# Patient Record
Sex: FEMALE | Race: WHITE | URBAN - METROPOLITAN AREA
[De-identification: names, ages, dates, MRNs, and addresses within clinical notes are randomized per-mention and may not be internally consistent; named-entity substitution may affect disease eponyms.]

---

## 2019-12-27 ENCOUNTER — OFFICE VISIT (OUTPATIENT)
Dept: PRIMARY CARE CLINIC | Age: 44
End: 2019-12-27
Payer: COMMERCIAL

## 2019-12-27 VITALS
HEIGHT: 68 IN | OXYGEN SATURATION: 98 % | DIASTOLIC BLOOD PRESSURE: 64 MMHG | TEMPERATURE: 98.2 F | BODY MASS INDEX: 27.01 KG/M2 | WEIGHT: 178.2 LBS | HEART RATE: 65 BPM | SYSTOLIC BLOOD PRESSURE: 118 MMHG

## 2019-12-27 DIAGNOSIS — J06.9 VIRAL UPPER RESPIRATORY TRACT INFECTION: Primary | ICD-10-CM

## 2019-12-27 PROCEDURE — 99213 OFFICE O/P EST LOW 20 MIN: CPT | Performed by: NURSE PRACTITIONER

## 2019-12-27 PROCEDURE — G8419 CALC BMI OUT NRM PARAM NOF/U: HCPCS | Performed by: NURSE PRACTITIONER

## 2019-12-27 PROCEDURE — G8484 FLU IMMUNIZE NO ADMIN: HCPCS | Performed by: NURSE PRACTITIONER

## 2019-12-27 PROCEDURE — G8427 DOCREV CUR MEDS BY ELIG CLIN: HCPCS | Performed by: NURSE PRACTITIONER

## 2019-12-27 PROCEDURE — 1036F TOBACCO NON-USER: CPT | Performed by: NURSE PRACTITIONER

## 2019-12-27 RX ORDER — ESCITALOPRAM OXALATE 5 MG/1
5 TABLET ORAL DAILY
COMMUNITY

## 2019-12-27 SDOH — HEALTH STABILITY: MENTAL HEALTH: HOW OFTEN DO YOU HAVE A DRINK CONTAINING ALCOHOL?: NEVER

## 2019-12-27 ASSESSMENT — ENCOUNTER SYMPTOMS
SHORTNESS OF BREATH: 0
VOICE CHANGE: 1
DIARRHEA: 1
SORE THROAT: 1
COUGH: 1

## 2019-12-30 ASSESSMENT — ENCOUNTER SYMPTOMS
NAUSEA: 0
CONSTIPATION: 0
RHINORRHEA: 1
VOMITING: 0

## 2024-03-04 ASSESSMENT — SLEEP AND FATIGUE QUESTIONNAIRES
HOW LIKELY ARE YOU TO NOD OFF OR FALL ASLEEP WHILE SITTING INACTIVE IN A PUBLIC PLACE: WOULD NEVER DOZE
HOW LIKELY ARE YOU TO NOD OFF OR FALL ASLEEP WHEN YOU ARE A PASSENGER IN A CAR FOR AN HOUR WITHOUT A BREAK: HIGH CHANCE OF DOZING
ESS TOTAL SCORE: 10
HOW LIKELY ARE YOU TO NOD OFF OR FALL ASLEEP IN A CAR, WHILE STOPPED FOR A FEW MINUTES IN TRAFFIC: WOULD NEVER DOZE
HOW LIKELY ARE YOU TO NOD OFF OR FALL ASLEEP WHILE SITTING INACTIVE IN A PUBLIC PLACE: 0
HOW LIKELY ARE YOU TO NOD OFF OR FALL ASLEEP WHILE SITTING QUIETLY AFTER LUNCH WITHOUT ALCOHOL: 0
HOW LIKELY ARE YOU TO NOD OFF OR FALL ASLEEP IN A CAR, WHILE STOPPED FOR A FEW MINUTES IN TRAFFIC: 0
HOW LIKELY ARE YOU TO NOD OFF OR FALL ASLEEP WHILE SITTING AND READING: HIGH CHANCE OF DOZING
HOW LIKELY ARE YOU TO NOD OFF OR FALL ASLEEP WHILE LYING DOWN TO REST IN THE AFTERNOON WHEN CIRCUMSTANCES PERMIT: HIGH CHANCE OF DOZING
HOW LIKELY ARE YOU TO NOD OFF OR FALL ASLEEP WHILE SITTING AND TALKING TO SOMEONE: WOULD NEVER DOZE
NECK CIRCUMFERENCE (INCHES): 15
HOW LIKELY ARE YOU TO NOD OFF OR FALL ASLEEP WHILE SITTING AND READING: 3
HOW LIKELY ARE YOU TO NOD OFF OR FALL ASLEEP WHILE WATCHING TV: 1
HOW LIKELY ARE YOU TO NOD OFF OR FALL ASLEEP WHILE SITTING AND TALKING TO SOMEONE: 0
HOW LIKELY ARE YOU TO NOD OFF OR FALL ASLEEP WHILE LYING DOWN TO REST IN THE AFTERNOON WHEN CIRCUMSTANCES PERMIT: 3
HOW LIKELY ARE YOU TO NOD OFF OR FALL ASLEEP WHILE SITTING QUIETLY AFTER LUNCH WITHOUT ALCOHOL: WOULD NEVER DOZE
HOW LIKELY ARE YOU TO NOD OFF OR FALL ASLEEP WHEN YOU ARE A PASSENGER IN A CAR FOR AN HOUR WITHOUT A BREAK: 3
HOW LIKELY ARE YOU TO NOD OFF OR FALL ASLEEP WHILE WATCHING TV: SLIGHT CHANCE OF DOZING

## 2024-03-05 ENCOUNTER — OFFICE VISIT (OUTPATIENT)
Dept: PULMONOLOGY | Age: 49
End: 2024-03-05
Payer: COMMERCIAL

## 2024-03-05 VITALS
HEIGHT: 68 IN | WEIGHT: 180 LBS | OXYGEN SATURATION: 98 % | BODY MASS INDEX: 27.28 KG/M2 | DIASTOLIC BLOOD PRESSURE: 84 MMHG | SYSTOLIC BLOOD PRESSURE: 126 MMHG | HEART RATE: 78 BPM

## 2024-03-05 DIAGNOSIS — R06.83 SNORING: ICD-10-CM

## 2024-03-05 DIAGNOSIS — R06.81 WITNESSED EPISODE OF APNEA: ICD-10-CM

## 2024-03-05 DIAGNOSIS — R53.83 FATIGUE, UNSPECIFIED TYPE: ICD-10-CM

## 2024-03-05 DIAGNOSIS — G47.10 HYPERSOMNIA: Primary | ICD-10-CM

## 2024-03-05 PROCEDURE — G8484 FLU IMMUNIZE NO ADMIN: HCPCS | Performed by: STUDENT IN AN ORGANIZED HEALTH CARE EDUCATION/TRAINING PROGRAM

## 2024-03-05 PROCEDURE — G8419 CALC BMI OUT NRM PARAM NOF/U: HCPCS | Performed by: STUDENT IN AN ORGANIZED HEALTH CARE EDUCATION/TRAINING PROGRAM

## 2024-03-05 PROCEDURE — G8428 CUR MEDS NOT DOCUMENT: HCPCS | Performed by: STUDENT IN AN ORGANIZED HEALTH CARE EDUCATION/TRAINING PROGRAM

## 2024-03-05 PROCEDURE — 99204 OFFICE O/P NEW MOD 45 MIN: CPT | Performed by: STUDENT IN AN ORGANIZED HEALTH CARE EDUCATION/TRAINING PROGRAM

## 2024-03-05 PROCEDURE — 1036F TOBACCO NON-USER: CPT | Performed by: STUDENT IN AN ORGANIZED HEALTH CARE EDUCATION/TRAINING PROGRAM

## 2024-03-05 NOTE — PATIENT INSTRUCTIONS
Patient information on the evaluation procedure for sleep apnea:    1. You are going to have a portable sleep study:  This is a home sleep study that will be done to see if you have obstructive sleep apnea. You will have a flow monitor on your nose, belt on your chest and a oxygen/heart rate monitor on your finger. Please do not wear nail polish on day of the study as it will interfere with the oxygen reading probe that is placed on your finger during the study.   Once you receive the device, you will also receive instructions on how to put it on and turn it on.  After 2 nights you will return it.    2. The sleep office will call you with the results a week after the study.     If the study showed that you have obstructive sleep apnea you will be told of the next step in your care. Commonly the recommended treatment is CPAP Therapy. If you agree to this therapy and you receive your CPAP before your next appointment, please bring it with you to your first follow-up appointment.      Patients who have obstructive sleep apnea on the sleep study and have chosen to try CPAP:  A home equipment company will contact you to set you up with a CPAP machine and fit you for a mask.    Please bring your CPAP, the mask and all supplies including the electrical cord with you to all your first follow up appointments with the sleep physician    Please keep trying to use your CPAP until you have seen in the sleep office in follow up as a lot of the problems patients have with CPAP can be addressed and corrected by your sleep provider.    Sleep center contact information:  [facility]: [phone]  [facility]: [phone]             What are the risk factors for Obstructive Sleep Apnea (ARTURO)?  Obesity  Snoring  Daytime sleepiness  Increasing age  Male gender  Taking sedating medications  Alcohol use  Hypertension  Stroke  Diabetes  Smoking     What is ARTURO?  People with ARTURO experience recurrent

## 2024-03-05 NOTE — PROGRESS NOTES
for obstructive sleep apnea, we will therefore proceed with auto CPAP unless in lab PAP titration is indicated based on the sleep study.   She understands that untreated ARTURO is associated with heart failure, atrial fibrillation, stroke, HTN, Alzheimer's and impaired glucose tolerance.    She should avoid respiratory suppressants as these can worsen sleep disordered breathing.    She should never drive if drowsy and should pull over at a safe place if she becomes drowsy while driving. A handout on drowsy driving tips was given to the patient.    Overweight/obesity BMI: Weight loss is associated with improvement in sleep disordered breathing. Kimberli Ruffin should exercise regularly and watch her diet.    No follow-ups on file. Will schedule patient for follow-up after sleep test.    Santy Thompson MD  Sleep Medicine  11:31 AM    This dictation was generated by voice recognition computer software.  Although all attempts are made to edit the dictation for accuracy, there may be errors in the transcription that are not intended.

## 2024-03-06 ENCOUNTER — TELEPHONE (OUTPATIENT)
Dept: SLEEP CENTER | Age: 49
End: 2024-03-06

## 2024-07-15 ENCOUNTER — HOSPITAL ENCOUNTER (OUTPATIENT)
Dept: SLEEP CENTER | Age: 49
Discharge: HOME | End: 2024-07-15
Payer: COMMERCIAL

## 2024-07-15 DIAGNOSIS — G47.10 HYPERSOMNIA: ICD-10-CM

## 2024-07-15 DIAGNOSIS — R06.81 WITNESSED EPISODE OF APNEA: ICD-10-CM

## 2024-07-15 DIAGNOSIS — R53.83 FATIGUE, UNSPECIFIED TYPE: ICD-10-CM

## 2024-07-15 DIAGNOSIS — R06.83 SNORING: ICD-10-CM

## 2024-07-15 PROCEDURE — 95806 SLEEP STUDY UNATT&RESP EFFT: CPT

## 2024-07-19 ENCOUNTER — TELEPHONE (OUTPATIENT)
Dept: PULMONOLOGY | Age: 49
End: 2024-07-19

## 2024-07-19 NOTE — TELEPHONE ENCOUNTER
Please inform patient that her sleep study showed moderate sleep apnea and that she stopped breathing or her breathing was inadequate about 17 times for every hour of sleep. Her blood oxygen also dropped as low as 85% during the night.     If she agrees I will order a CPAP via a durable medical equipment company of their choice (if no preference, we will go with [facility], based on location) and they will reach out to keep her updated on the process. This will be the company that is going to work with her insurance and provide the CPAP device, along with replacing the mask and other supplies going forward. If they have any questions, they can let you know or message me via Debte[employer]. If patient agrees with plan, please route the PAP order to DME company (order already completed on a separate order encounter). Patient should be scheduled for 31 to 90 days follow up.    Thanks  Santy Thompson MD

## 2024-07-19 NOTE — PROGRESS NOTES
Diagnosis: [x] ARTURO  (G47.33) [] CSA (G47.31) []  Other:__________________   Length of Need: [] 13 months [x]  99 Months                                          []  Other:__________________   Machine (RENARD): [] Respironics Auto (with modem for remote monitoring)       [] Other:____________________    [x]  ResMed Auto (with modem for remote monitoring)    [x]  CPAP () [] Bilevel ()   Mode: Mode:   [x] Auto [] Fixed [] Auto [] Spontaneous   Pmin:_____5____cmH2O      Pmax:_____15____cmH2O   P:_________cmH2O    EPAPmin:__________cmH2O IPAP:__________cmH2O     IPAPmax:__________cmH2O EPAP:__________cmH2O     PSmin:_______  PSmax:_______       (ResMed) PS:_________     Flex/EPR - 3 full time                          Ramp time: 30 min Flex/EPR - 3 full time                 Ramp time: 30 min   Ramp Pressure:_____4______cmH2O Ramp Pressure:____________cmH2O         Humidifier: [x] Heated ()                                               [] Passive     [x] Water chamber replacement ()/ 1 per 6 months   Mask:   [x] Nasal () /1 per 3 months [] Full Face () /1 per 3 months   [x] Patient choice -Size and fit mask [] Patient Choice - Size and fit mask   [x] Dispense: nasal cushion  [] Dispense:    [] Dispense:    [x] Headgear () / 1 per 3 months [] Headgear () / 1 per 3 months   [x] Replacement Nasal Cushion ()/2 per month [] Interface Replacement ()/1 per month   [] Replacement Nasal Pillows ()/2 per month       Tubing: [x] Heated ()/1 per 3 months                           [] Standard ()/1 per 3 months  [] Other:____________________   Filters: [x] Non-disposable ()/1 per 6 months                 [x] Ultra-Fine, Disposable ()/2 per month     Miscellaneous: [x] Chin Strap as needed ()/ 1 per 6months      [] Other:__________________________________         Start Order Date: 07/19/24    MEDICAL JUSTIFICATION:  I, the undersigned, certify that the above

## 2024-07-22 ENCOUNTER — TELEPHONE (OUTPATIENT)
Dept: PULMONOLOGY | Age: 49
End: 2024-07-22

## 2024-07-22 PROBLEM — G47.10 HYPERSOMNIA: Status: ACTIVE | Noted: 2024-07-22
